# Patient Record
(demographics unavailable — no encounter records)

---

## 2025-07-30 NOTE — PHYSICAL EXAM
[General Appearance - Alert] : alert [General Appearance - In No Acute Distress] : in no acute distress [Person] : oriented to person [Place] : oriented to place [Cranial Nerves Optic (II)] : visual acuity intact bilaterally,  pupils equal round and reactive to light [Cranial Nerves Oculomotor (III)] : extraocular motion intact [Cranial Nerves Trigeminal (V)] : facial sensation intact symmetrically [Cranial Nerves Facial (VII)] : face symmetrical [Cranial Nerves Vestibulocochlear (VIII)] : hearing was intact bilaterally [Cranial Nerves Glossopharyngeal (IX)] : tongue and palate midline [Cranial Nerves Accessory (XI - Cranial And Spinal)] : head turning and shoulder shrug symmetric [Cranial Nerves Hypoglossal (XII)] : there was no tongue deviation with protrusion [Sensation Tactile Decrease] : light touch was intact [Sclera] : the sclera and conjunctiva were normal [PERRL With Normal Accommodation] : pupils were equal in size, round, reactive to light, with normal accommodation [Extraocular Movements] : extraocular movements were intact [Over the Past 2 Weeks, Have You Felt Down, Depressed, or Hopeless?] : 1.) Over the past 2 weeks, have you felt down, depressed, or hopeless? No [Over the Past 2 Weeks, Have You Felt Little Interest or Pleasure Doing Things?] : 2.) Over the past 2 weeks, have you felt little interest or pleasure doing things? No [Time] : disoriented to time [FreeTextEntry6] : [place] strong symmetric ~4+/5, BUE drift but RUE worse than left

## 2025-07-30 NOTE — HISTORY OF PRESENT ILLNESS
[de-identified] : 78 year old male with history of nph sp vps 2022 [facility] 150 per OSH records that family has.  Shunt was done by Ryanne Kaur in [facility].  Family here with patient to establish care.  2022 patient became bedridden due to gait unsteadiness/incontinence/completely loss of interacting with others.  CTH reportedly showed significant ventriculomegaly.  Following trial and later shunt placement, patient drastically improved.  Now attends and interactive.  Still +memory loss, uses wheelchair (walker with assist), some incontinence.  Improvement stabilized approximately mid 2024 per patient family. Baseline oriented x1-2 and generalized waekness. No ha/n/v/fevers. -CTH, CT abdomen, shunt series (confirm valve); all to get baseline

## 2025-07-30 NOTE — ASSESSMENT
[FreeTextEntry1] : 78 year old male with history of nph sp vps 2022 [facility] 150 per OSH records that family has.  Shunt was done by Ryanne Kaur in [facility].  Family here with patient to establish care.  2022 patient became bedridden due to gait unsteadiness/incontinence/completely loss of interacting with others.  CTH reportedly showed significant ventriculomegaly.  Following trial and later shunt placement, patient drastically improved.  Now attends and interactive.  Still +memory loss, uses wheelchair (walker with assist), some incontinence.  Improvement stabilized approximately mid 2024 per patient family. Baseline oriented x1-2 and generalized waekness. No ha/n/v/fevers. -CTH, CT abdomen, shunt series (confirm valve); all to get baseline -Continue PT